# Patient Record
Sex: FEMALE | Race: WHITE | Employment: FULL TIME | URBAN - METROPOLITAN AREA
[De-identification: names, ages, dates, MRNs, and addresses within clinical notes are randomized per-mention and may not be internally consistent; named-entity substitution may affect disease eponyms.]

---

## 2023-12-20 PROBLEM — I48.91 ATRIAL FIBRILLATION WITH RVR (HCC): Status: ACTIVE | Noted: 2023-12-08

## 2023-12-26 ENCOUNTER — TELEPHONE (OUTPATIENT)
Dept: FAMILY MEDICINE CLINIC | Age: 53
End: 2023-12-26

## 2023-12-26 DIAGNOSIS — L40.59 POLYARTICULAR PSORIATIC ARTHRITIS (HCC): ICD-10-CM

## 2023-12-26 NOTE — TELEPHONE ENCOUNTER
LMOM for patient to return call. Office received fax for a prescription request for Rinvoq to be sent to [facility]. Wanting to confirm that she would like the prescription sent to this pharmacy as it is not currently listed in her chart.

## 2023-12-26 NOTE — TELEPHONE ENCOUNTER
Patient called back. She states starting on January 1st they will no longer be using [facility] and will have [employer] [facility] instead.

## 2024-01-11 ENCOUNTER — TELEPHONE (OUTPATIENT)
Dept: FAMILY MEDICINE CLINIC | Age: 54
End: 2024-01-11

## 2024-01-11 NOTE — TELEPHONE ENCOUNTER
Rinvoq denied per insurance. Denial scanned into patient's chart.     \"Your plan only covers this drug for your health condition, psoriatic arthritis, if your doctor is a rheumatologist or dermatologist or has talked about your care plan with a rheumatologist, or dermatologist.

## 2024-01-16 NOTE — TELEPHONE ENCOUNTER
PT called- said she spoke with her insurance company. They asked that the  office call to verify the medical necessity for the medication   Please call [facility]-/[place] [phone]    PT is also checking with a discount program to get the medication cheaper.

## 2024-01-18 NOTE — TELEPHONE ENCOUNTER
Called number who started a new prior authorization through covermymeds. This has been started, awaiting response from insurance.

## 2024-01-19 NOTE — TELEPHONE ENCOUNTER
Patient called in regarding Rinvoq     7:31am this morning she received text saying this was approved to contact her pharmacy.  She contacted [facility] and they do not have it.  Patient requesting this be sent over or please let her know next steps.    Patient phone [phone]

## 2024-01-22 DIAGNOSIS — L40.59 POLYARTICULAR PSORIATIC ARTHRITIS (HCC): ICD-10-CM

## 2024-01-22 RX ORDER — UPADACITINIB 15 MG/1
15 TABLET, EXTENDED RELEASE ORAL DAILY
Qty: 30 TABLET | Refills: 2 | Status: SHIPPED | OUTPATIENT
Start: 2024-01-22 | End: 2024-01-24 | Stop reason: SDUPTHER

## 2024-01-24 DIAGNOSIS — L40.59 POLYARTICULAR PSORIATIC ARTHRITIS (HCC): ICD-10-CM

## 2024-01-24 RX ORDER — UPADACITINIB 15 MG/1
15 TABLET, EXTENDED RELEASE ORAL DAILY
Qty: 90 TABLET | Refills: 3 | Status: SHIPPED | OUTPATIENT
Start: 2024-01-24

## 2024-01-29 ENCOUNTER — TELEPHONE (OUTPATIENT)
Dept: FAMILY MEDICINE CLINIC | Age: 54
End: 2024-01-29

## 2024-01-29 NOTE — TELEPHONE ENCOUNTER
Mini is calling from [facility] and is asking for an up to date TB result, because patient was prescribed Rinvoq.    Please call or fax results.      [phone]

## 2024-02-07 ENCOUNTER — PATIENT MESSAGE (OUTPATIENT)
Dept: FAMILY MEDICINE CLINIC | Age: 54
End: 2024-02-07

## 2024-02-07 NOTE — TELEPHONE ENCOUNTER
From: Denae Smith  To: Dr. Mason HU Ramiro  Sent: 2/7/2024 1:04 PM EST  Subject: Afib again    Hello, was just back in hospital again Feb 3-5 for passing out at work. It was quick guessing less than a min, when I came through I was on the floor. They said Afib and pcv (think that is right combo). They kept trying to put me on lots of different meds but my BP after thinner was staying around 95/65ish. I see my heart Dr Youngblood Mon the 12th. Do I need to schedule with you also or just see what he says. Also, all results will be from [facility]. I seen they put chest pain, I told them several times no Chest pain, tingling or tightness.   Thanks  Denae Smith

## 2024-03-15 ENCOUNTER — PATIENT MESSAGE (OUTPATIENT)
Dept: FAMILY MEDICINE CLINIC | Age: 54
End: 2024-03-15

## 2024-05-25 ASSESSMENT — PATIENT HEALTH QUESTIONNAIRE - PHQ9
2. FEELING DOWN, DEPRESSED OR HOPELESS: NOT AT ALL
1. LITTLE INTEREST OR PLEASURE IN DOING THINGS: NOT AT ALL
SUM OF ALL RESPONSES TO PHQ QUESTIONS 1-9: 0
2. FEELING DOWN, DEPRESSED OR HOPELESS: NOT AT ALL
SUM OF ALL RESPONSES TO PHQ QUESTIONS 1-9: 0
1. LITTLE INTEREST OR PLEASURE IN DOING THINGS: NOT AT ALL
SUM OF ALL RESPONSES TO PHQ QUESTIONS 1-9: 0
SUM OF ALL RESPONSES TO PHQ9 QUESTIONS 1 & 2: 0
SUM OF ALL RESPONSES TO PHQ QUESTIONS 1-9: 0
SUM OF ALL RESPONSES TO PHQ9 QUESTIONS 1 & 2: 0

## 2024-05-28 ENCOUNTER — OFFICE VISIT (OUTPATIENT)
Dept: FAMILY MEDICINE CLINIC | Age: 54
End: 2024-05-28
Payer: COMMERCIAL

## 2024-05-28 VITALS
DIASTOLIC BLOOD PRESSURE: 66 MMHG | WEIGHT: 184.8 LBS | HEIGHT: 66 IN | SYSTOLIC BLOOD PRESSURE: 114 MMHG | TEMPERATURE: 97.7 F | BODY MASS INDEX: 29.7 KG/M2

## 2024-05-28 DIAGNOSIS — E55.9 VITAMIN D DEFICIENCY: ICD-10-CM

## 2024-05-28 DIAGNOSIS — Z00.00 ROUTINE GENERAL MEDICAL EXAMINATION AT A HEALTH CARE FACILITY: ICD-10-CM

## 2024-05-28 DIAGNOSIS — Z00.00 ENCOUNTER FOR ROUTINE ADULT HEALTH EXAMINATION WITHOUT ABNORMAL FINDINGS: ICD-10-CM

## 2024-05-28 DIAGNOSIS — L40.59 POLYARTICULAR PSORIATIC ARTHRITIS (HCC): ICD-10-CM

## 2024-05-28 DIAGNOSIS — Z00.00 ROUTINE GENERAL MEDICAL EXAMINATION AT A HEALTH CARE FACILITY: Primary | ICD-10-CM

## 2024-05-28 LAB
ALBUMIN SERPL-MCNC: 4.5 G/DL (ref 3.5–4.6)
ALP SERPL-CCNC: 103 U/L (ref 40–130)
ALT SERPL-CCNC: 41 U/L (ref 0–33)
ANION GAP SERPL CALCULATED.3IONS-SCNC: 10 MEQ/L (ref 9–15)
AST SERPL-CCNC: 38 U/L (ref 0–35)
BASOPHILS # BLD: 0 K/UL (ref 0–0.2)
BASOPHILS NFR BLD: 0.6 %
BILIRUB SERPL-MCNC: 0.4 MG/DL (ref 0.2–0.7)
BUN SERPL-MCNC: 13 MG/DL (ref 6–20)
CALCIUM SERPL-MCNC: 9.4 MG/DL (ref 8.5–9.9)
CHLORIDE SERPL-SCNC: 105 MEQ/L (ref 95–107)
CHOLEST SERPL-MCNC: 183 MG/DL (ref 0–199)
CO2 SERPL-SCNC: 27 MEQ/L (ref 20–31)
CREAT SERPL-MCNC: 0.82 MG/DL (ref 0.5–0.9)
CRP SERPL HS-MCNC: <3 MG/L (ref 0–5)
EOSINOPHIL # BLD: 0 K/UL (ref 0–0.7)
EOSINOPHIL NFR BLD: 0.8 %
ERYTHROCYTE [DISTWIDTH] IN BLOOD BY AUTOMATED COUNT: 13 % (ref 11.5–14.5)
ERYTHROCYTE [SEDIMENTATION RATE] IN BLOOD BY WESTERGREN METHOD: 4 MM (ref 0–30)
GLOBULIN SER CALC-MCNC: 2.3 G/DL (ref 2.3–3.5)
GLUCOSE FASTING: 91 MG/DL (ref 70–99)
HCT VFR BLD AUTO: 38.1 % (ref 37–47)
HDLC SERPL-MCNC: 90 MG/DL (ref 40–59)
HGB BLD-MCNC: 12.6 G/DL (ref 12–16)
LDL CHOLESTEROL: 82 MG/DL (ref 0–129)
LYMPHOCYTES # BLD: 2.1 K/UL (ref 1–4.8)
LYMPHOCYTES NFR BLD: 38.7 %
MCH RBC QN AUTO: 29.2 PG (ref 27–31.3)
MCHC RBC AUTO-ENTMCNC: 33.1 % (ref 33–37)
MCV RBC AUTO: 88.4 FL (ref 79.4–94.8)
MONOCYTES # BLD: 0.4 K/UL (ref 0.2–0.8)
MONOCYTES NFR BLD: 7.7 %
NEUTROPHILS # BLD: 2.8 K/UL (ref 1.4–6.5)
NEUTS SEG NFR BLD: 52 %
PLATELET # BLD AUTO: 353 K/UL (ref 130–400)
POTASSIUM SERPL-SCNC: 5 MEQ/L (ref 3.4–4.9)
PROT SERPL-MCNC: 6.8 G/DL (ref 6.3–8)
RBC # BLD AUTO: 4.31 M/UL (ref 4.2–5.4)
SODIUM SERPL-SCNC: 142 MEQ/L (ref 135–144)
TRIGLYCERIDE, FASTING: 54 MG/DL (ref 0–150)
WBC # BLD AUTO: 5.3 K/UL (ref 4.8–10.8)

## 2024-05-28 PROCEDURE — 99396 PREV VISIT EST AGE 40-64: CPT | Performed by: FAMILY MEDICINE

## 2024-05-28 RX ORDER — APIXABAN 5 MG/1
5 TABLET, FILM COATED ORAL 2 TIMES DAILY
COMMUNITY

## 2024-05-28 NOTE — PROGRESS NOTES
Insecurity (12/17/2023)    Hunger Vital Sign     Worried About Running Out of Food in the Last Year: Never true     Ran Out of Food in the Last Year: Never true   Transportation Needs: Unknown (12/17/2023)    PRAPARE - Transportation     Lack of Transportation (Medical): Not on file     Lack of Transportation (Non-Medical): No   Physical Activity: Not on file   Stress: Not on file   Social Connections: Not on file   Intimate Partner Violence: Not on file   Housing Stability: Unknown (12/17/2023)    Housing Stability Vital Sign     Unable to Pay for Housing in the Last Year: Not on file     Number of Places Lived in the Last Year: Not on file     Unstable Housing in the Last Year: No     Family History   Problem Relation Age of Onset    Arthritis Mother     Other Mother         Brain Hemorrhage passed    Arthritis Father     Cancer Father         Skin and prostate    Heart Attack Father         Stints    High Blood Pressure Father     Prostate Cancer Father     Heart Attack Maternal Grandfather         Massive passed    Arthritis Maternal Grandmother     Cancer Maternal Grandmother         Skin    Psoriasis Maternal Grandmother     Heart Attack Paternal Grandfather         Passed    Heart Attack Paternal Grandmother         Passed    Cancer Maternal Aunt         Pancreatic    Cancer Maternal Uncle         Lung and Brain    Cancer Paternal Uncle         Prostate    Heart Attack Paternal Aunt         Several     Allergies   Allergen Reactions    Nsaids Other (See Comments)     Other Reaction(s): Contraindication-Medical Surgical      Pt is s/p gastric bypass, avoid NSAIDs due to risk of ulcer    Pt is s/p gastric bypass, avoid NSAIDs due to risk of ulcer     Current Outpatient Medications   Medication Sig Dispense Refill    ELIQUIS 5 MG TABS tablet Take 1 tablet by mouth 2 times daily      Upadacitinib ER (RINVOQ) 15 MG TB24 Take 15 mg by mouth daily 90 tablet 3    nortriptyline (PAMELOR) 10 MG capsule Take 1 capsule

## 2024-05-29 ENCOUNTER — PATIENT MESSAGE (OUTPATIENT)
Dept: FAMILY MEDICINE CLINIC | Age: 54
End: 2024-05-29

## 2024-05-29 LAB — VITAMIN D 25-HYDROXY: 33.4 NG/ML (ref 30–100)

## 2024-05-30 NOTE — TELEPHONE ENCOUNTER
From: Denae Smith  Sent: 5/30/2024 12:31 PM EDT  To: Bettye Carrasco  Clinical Staff  Subject: labs    I wasn't fasting that day I did tell her I ate some squash that had a little butter. Not sure if that effected the HDL result, or any if the other. So do I need to take any again to verify or we r good til next blood draw?   Denae

## 2025-03-19 ENCOUNTER — OFFICE VISIT (OUTPATIENT)
Age: 55
End: 2025-03-19
Payer: COMMERCIAL

## 2025-03-19 VITALS
BODY MASS INDEX: 30.25 KG/M2 | TEMPERATURE: 98.1 F | HEART RATE: 89 BPM | DIASTOLIC BLOOD PRESSURE: 72 MMHG | OXYGEN SATURATION: 96 % | WEIGHT: 188.2 LBS | HEIGHT: 66 IN | SYSTOLIC BLOOD PRESSURE: 122 MMHG

## 2025-03-19 DIAGNOSIS — R49.0 HOARSENESS OF VOICE: ICD-10-CM

## 2025-03-19 DIAGNOSIS — M19.012 ARTHRITIS OF LEFT STERNOCLAVICULAR JOINT: Primary | ICD-10-CM

## 2025-03-19 PROCEDURE — 99213 OFFICE O/P EST LOW 20 MIN: CPT | Performed by: FAMILY MEDICINE

## 2025-03-19 RX ORDER — METHYLPREDNISOLONE 4 MG/1
TABLET ORAL
Qty: 1 KIT | Refills: 0 | Status: SHIPPED | OUTPATIENT
Start: 2025-03-19

## 2025-03-19 RX ORDER — RISANKIZUMAB-RZAA 150 MG/ML
INJECTION SUBCUTANEOUS
COMMUNITY
Start: 2024-08-23

## 2025-03-19 SDOH — ECONOMIC STABILITY: FOOD INSECURITY: WITHIN THE PAST 12 MONTHS, THE FOOD YOU BOUGHT JUST DIDN'T LAST AND YOU DIDN'T HAVE MONEY TO GET MORE.: NEVER TRUE

## 2025-03-19 SDOH — ECONOMIC STABILITY: FOOD INSECURITY: WITHIN THE PAST 12 MONTHS, YOU WORRIED THAT YOUR FOOD WOULD RUN OUT BEFORE YOU GOT MONEY TO BUY MORE.: NEVER TRUE

## 2025-03-19 ASSESSMENT — PATIENT HEALTH QUESTIONNAIRE - PHQ9
2. FEELING DOWN, DEPRESSED OR HOPELESS: NOT AT ALL
1. LITTLE INTEREST OR PLEASURE IN DOING THINGS: NOT AT ALL
SUM OF ALL RESPONSES TO PHQ QUESTIONS 1-9: 0

## 2025-03-19 NOTE — PROGRESS NOTES
Tobacco Use    Smoking status: Never    Smokeless tobacco: Never   Vaping Use    Vaping status: Never Used   Substance and Sexual Activity    Alcohol use: Never    Drug use: Never    Sexual activity: Never   Other Topics Concern    Not on file   Social History Narrative    Not on file     Social Drivers of Health     Financial Resource Strain: Low Risk  (12/17/2023)    Overall Financial Resource Strain (CARDIA)     Difficulty of Paying Living Expenses: Not hard at all   Food Insecurity: No Food Insecurity (3/19/2025)    Hunger Vital Sign     Worried About Running Out of Food in the Last Year: Never true     Ran Out of Food in the Last Year: Never true   Transportation Needs: No Transportation Needs (3/19/2025)    PRAPARE - Transportation     Lack of Transportation (Medical): No     Lack of Transportation (Non-Medical): No   Physical Activity: Not on file   Stress: Not on file   Social Connections: Not on file   Intimate Partner Violence: Not on file   Housing Stability: Low Risk  (3/19/2025)    Housing Stability Vital Sign     Unable to Pay for Housing in the Last Year: No     Number of Times Moved in the Last Year: 0     Homeless in the Last Year: No     Family History   Problem Relation Age of Onset    Arthritis Mother     Other Mother         Brain Hemorrhage passed    Arthritis Father     Cancer Father         Skin and prostate    Heart Attack Father         Stints    High Blood Pressure Father     Prostate Cancer Father     Heart Attack Maternal Grandfather         Massive passed    Arthritis Maternal Grandmother     Cancer Maternal Grandmother         Skin    Psoriasis Maternal Grandmother     Heart Attack Paternal Grandfather         Passed    Heart Attack Paternal Grandmother         Passed    Cancer Maternal Aunt         Pancreatic    Cancer Maternal Uncle         Lung and Brain    Cancer Paternal Uncle         Prostate    Heart Attack Paternal Aunt         Several      Allergies   Allergen Reactions

## 2025-03-21 ENCOUNTER — RESULTS FOLLOW-UP (OUTPATIENT)
Age: 55
End: 2025-03-21

## 2025-04-02 ENCOUNTER — PATIENT MESSAGE (OUTPATIENT)
Age: 55
End: 2025-04-02

## 2025-04-02 DIAGNOSIS — R49.0 HOARSENESS OF VOICE: Primary | ICD-10-CM

## 2025-05-08 ENCOUNTER — OFFICE VISIT (OUTPATIENT)
Age: 55
End: 2025-05-08
Payer: COMMERCIAL

## 2025-05-08 VITALS
WEIGHT: 191 LBS | HEIGHT: 66 IN | TEMPERATURE: 96 F | OXYGEN SATURATION: 98 % | HEART RATE: 88 BPM | SYSTOLIC BLOOD PRESSURE: 110 MMHG | BODY MASS INDEX: 30.7 KG/M2 | DIASTOLIC BLOOD PRESSURE: 75 MMHG | RESPIRATION RATE: 15 BRPM

## 2025-05-08 DIAGNOSIS — R49.0 HOARSENESS: ICD-10-CM

## 2025-05-08 DIAGNOSIS — J06.9 ACUTE UPPER RESPIRATORY INFECTION: ICD-10-CM

## 2025-05-08 DIAGNOSIS — M25.512 PAIN OF LEFT STERNOCLAVICULAR JOINT: Primary | ICD-10-CM

## 2025-05-08 PROCEDURE — 99203 OFFICE O/P NEW LOW 30 MIN: CPT | Performed by: STUDENT IN AN ORGANIZED HEALTH CARE EDUCATION/TRAINING PROGRAM

## 2025-05-08 PROCEDURE — 31575 DIAGNOSTIC LARYNGOSCOPY: CPT | Performed by: STUDENT IN AN ORGANIZED HEALTH CARE EDUCATION/TRAINING PROGRAM

## 2025-05-08 NOTE — PROGRESS NOTES
[facility] SPECIALTY [facility], [facility] OTOLARYNGOLOGY  [address] [place] [address], [address]  [facility] [id number]  Dept: [phone]  Dept Fax: [phone]  Loc: [phone]     5/8/2025    Visit type: New patient    Reason for Visit: New Patient (Hoarseness of voice)       ASSESSMENT/PLAN   1. Pain of left sternoclavicular joint  2. Hoarseness  3. Acute upper respiratory infection    No orders of the defined types were placed in this encounter.     Her main concern today is the prominence of her sternoclavicular joint on the left side.  She has previously reported some pain and discomfort radiating to her shoulder because of this.  Although I do appreciate the bony prominence, I do not feel any soft tissue masses or anything concerning from my perspective.  I discussed with her that I do not treat this type of joint pain and I do not have any concerns about any this affecting her airway in any way.  Some reassurance was provided.  Regarding her voice changes, we did discuss flexible laryngoscopy.  She does note that she was just started on an antibiotic yesterday for a sinus infection.  We discussed that this may confound her scope exam findings if she has an active URI since we will be looking for chronic findings.  After discussion we did decide to defer the scope exam today and she could reschedule as a procedure visit at some point in the near future.     Subjective    Patient: Denae Smith is a 54 y.o. female   HPI:    Referred by: Mason Rubio, DO  I have personally reviewed the note by the referring provider     2 months of SC joint prominance  No preceding trauma   Was evaluated by PCP and XR was ordered   She did endorse some pain that radiated to the shoulder previously    Always had raspy voice- cuts in and out more    Has hiatal hernia- s/p surgery 2 yrs. No reflux medication after that     Feels like breathing OK. Certain situations/positions     Allergies   Allergen

## 2025-05-28 ENCOUNTER — OFFICE VISIT (OUTPATIENT)
Age: 55
End: 2025-05-28
Payer: COMMERCIAL

## 2025-05-28 VITALS
TEMPERATURE: 97.6 F | OXYGEN SATURATION: 97 % | HEART RATE: 87 BPM | BODY MASS INDEX: 30.86 KG/M2 | SYSTOLIC BLOOD PRESSURE: 124 MMHG | WEIGHT: 192 LBS | HEIGHT: 66 IN | DIASTOLIC BLOOD PRESSURE: 84 MMHG

## 2025-05-28 DIAGNOSIS — Z00.00 ROUTINE GENERAL MEDICAL EXAMINATION AT A HEALTH CARE FACILITY: Primary | ICD-10-CM

## 2025-05-28 DIAGNOSIS — R73.01 IMPAIRED FASTING GLUCOSE: ICD-10-CM

## 2025-05-28 DIAGNOSIS — Z00.00 ROUTINE GENERAL MEDICAL EXAMINATION AT A HEALTH CARE FACILITY: ICD-10-CM

## 2025-05-28 LAB
CHOLEST SERPL-MCNC: 165 MG/DL (ref 0–199)
GLUCOSE FASTING: 81 MG/DL (ref 70–99)
HDLC SERPL-MCNC: 82 MG/DL (ref 40–59)
LDL CHOLESTEROL: 75 MG/DL (ref 0–129)
TRIGLYCERIDE, FASTING: 42 MG/DL (ref 0–150)

## 2025-05-28 PROCEDURE — 99396 PREV VISIT EST AGE 40-64: CPT | Performed by: FAMILY MEDICINE

## 2025-05-28 NOTE — PROGRESS NOTES
Denae Smith (:  1970) is a 54 y.o. female, Established patient, here for evaluation of the following chief complaint(s):  Annual Exam (Denae is a 54 year old female who presents for a yearly physical. )          Subjective   History of Present Illness  The patient presents for a physical exam.    She reports that her middle finger exhibits a bending motion, which is associated with discomfort during fist formation. The skin on her hands is described as tight, with two fingers exhibiting no bending at all. She has experienced a recurrence of hand breakouts, prompting her to administer Skyrizi early. She notes that exposure to hot water alleviates the tension in her hand. Previous administration of a steroid pack resulted in improved hand mobility upon waking.    She experiences vertigo when lying down and turning to the left, but has not had any episodes of vomiting since 2019.    Past Medical History:   Diagnosis Date    Fibromyalgia     Polyarticular psoriatic arthritis (HCC)      Past Surgical History:   Procedure Laterality Date    HERNIA REPAIR  24    Hital hernia    TONSILLECTOMY      When I was 24     Social History     Socioeconomic History    Marital status:      Spouse name: Not on file    Number of children: Not on file    Years of education: Not on file    Highest education level: Not on file   Occupational History    Not on file   Tobacco Use    Smoking status: Never    Smokeless tobacco: Never   Vaping Use    Vaping status: Never Used   Substance and Sexual Activity    Alcohol use: Never    Drug use: Never    Sexual activity: Never   Other Topics Concern    Not on file   Social History Narrative    Not on file     Social Drivers of Health     Financial Resource Strain: Low Risk  (2023)    Overall Financial Resource Strain (CARDIA)     Difficulty of Paying Living Expenses: Not hard at all   Food Insecurity: No Food Insecurity (3/19/2025)    Hunger Vital Sign

## 2025-05-29 ENCOUNTER — RESULTS FOLLOW-UP (OUTPATIENT)
Age: 55
End: 2025-05-29

## 2025-05-29 LAB
ESTIMATED AVERAGE GLUCOSE: 120 MG/DL
HBA1C MFR BLD: 5.8 % (ref 4–6)

## 2025-05-30 ENCOUNTER — PATIENT MESSAGE (OUTPATIENT)
Age: 55
End: 2025-05-30

## 2025-05-30 RX ORDER — METHYLPREDNISOLONE 4 MG/1
TABLET ORAL
Qty: 21 TABLET | Refills: 0 | OUTPATIENT
Start: 2025-05-30 | End: 2025-06-05

## 2025-05-30 RX ORDER — METHYLPREDNISOLONE 4 MG/1
TABLET ORAL
Qty: 1 KIT | Refills: 0 | Status: SHIPPED | OUTPATIENT
Start: 2025-05-30

## 2025-07-09 DIAGNOSIS — R42 VERTIGO: ICD-10-CM

## 2025-07-09 DIAGNOSIS — H81.93 VESTIBULAR DYSFUNCTION OF BOTH EARS: Primary | ICD-10-CM
